# Patient Record
Sex: MALE | Race: WHITE | Employment: FULL TIME | URBAN - METROPOLITAN AREA
[De-identification: names, ages, dates, MRNs, and addresses within clinical notes are randomized per-mention and may not be internally consistent; named-entity substitution may affect disease eponyms.]

---

## 2023-06-10 ENCOUNTER — OFFICE VISIT (OUTPATIENT)
Age: 33
End: 2023-06-10

## 2023-06-10 VITALS
HEIGHT: 70 IN | WEIGHT: 189.1 LBS | SYSTOLIC BLOOD PRESSURE: 126 MMHG | DIASTOLIC BLOOD PRESSURE: 71 MMHG | OXYGEN SATURATION: 97 % | BODY MASS INDEX: 27.07 KG/M2 | HEART RATE: 89 BPM | TEMPERATURE: 98.4 F

## 2023-06-10 DIAGNOSIS — S61.319A LACERATION OF FINGERNAIL, INITIAL ENCOUNTER: Primary | ICD-10-CM

## 2023-06-10 RX ORDER — AMOXICILLIN AND CLAVULANATE POTASSIUM 875; 125 MG/1; MG/1
1 TABLET, FILM COATED ORAL 2 TIMES DAILY
Qty: 14 TABLET | Refills: 0 | Status: SHIPPED | OUTPATIENT
Start: 2023-06-10 | End: 2023-06-17

## 2023-06-10 ASSESSMENT — ENCOUNTER SYMPTOMS
SORE THROAT: 0
COUGH: 0
SHORTNESS OF BREATH: 0
RHINORRHEA: 0
VOMITING: 0
NAUSEA: 0
ABDOMINAL PAIN: 0
CHEST TIGHTNESS: 0

## 2023-06-10 NOTE — PROGRESS NOTES
HENT:      Head: Normocephalic and atraumatic. Eyes:      Extraocular Movements: Extraocular movements intact. Conjunctiva/sclera: Conjunctivae normal.   Skin:     General: Skin is warm and dry. Comments: LEFT INDEX FINGER: approximately 2cm L-shaped laceration to fingernail extends into the radial aspect of the adjacent tissue by less than 1mm and does not completely reach the tip of the nail (short by about 1mm). Bleeding is controlled. Sensation intact to fingertip, capillary refill brisk. Neurological:      General: No focal deficit present. Mental Status: He is alert. Psychiatric:         Mood and Affect: Mood normal.         Behavior: Behavior normal.          Assessment/ Plan:     Test Results:  No results found for this or any previous visit (from the past 6 hour(s)). 1. Laceration of fingernail, initial encounter       Impression:  Dermabond placed to close wound, finger splint provided to patient if he would like to use it for protection. Wound care instructions provided. Rx augmentin for infection prophylaxis- laceration on the hand. OTC for symptom management. Follow up with PCP as needed. Go to ED with development of any acute symptoms. Follow up: Follow up immediately for any new, worsening or changes or if symptoms are not improving over the next 5-7 days. LACERATION REPAIR    Date/Time: 6/10/2023 7:41 PM  Performed by: SHER Segovia NP  Authorized by: SHER Segovia NP   Location: left index finger.   Laceration length: 2 cm  Foreign bodies: no foreign bodies  Tendon involvement: none  Nerve involvement: none  Vascular damage: no    Sedation:  Patient sedated: no    Amount of cleaning: standard  Debridement: none  Degree of undermining: none  Skin closure: glue  Patient tolerance: patient tolerated the procedure well with no immediate complications          SHER Segovia NP